# Patient Record
Sex: FEMALE
[De-identification: names, ages, dates, MRNs, and addresses within clinical notes are randomized per-mention and may not be internally consistent; named-entity substitution may affect disease eponyms.]

---

## 2020-09-10 PROBLEM — Z00.00 ENCOUNTER FOR PREVENTIVE HEALTH EXAMINATION: Status: ACTIVE | Noted: 2020-09-10

## 2020-09-17 ENCOUNTER — APPOINTMENT (OUTPATIENT)
Dept: BARIATRICS | Facility: CLINIC | Age: 50
End: 2020-09-17
Payer: COMMERCIAL

## 2020-09-17 ENCOUNTER — TRANSCRIPTION ENCOUNTER (OUTPATIENT)
Age: 50
End: 2020-09-17

## 2020-09-17 VITALS — HEIGHT: 62 IN | BODY MASS INDEX: 34.6 KG/M2 | WEIGHT: 188 LBS

## 2020-09-17 DIAGNOSIS — Z78.9 OTHER SPECIFIED HEALTH STATUS: ICD-10-CM

## 2020-09-17 DIAGNOSIS — Z97.5 PRESENCE OF (INTRAUTERINE) CONTRACEPTIVE DEVICE: ICD-10-CM

## 2020-09-17 DIAGNOSIS — M17.10 UNILATERAL PRIMARY OSTEOARTHRITIS, UNSPECIFIED KNEE: ICD-10-CM

## 2020-09-17 DIAGNOSIS — Z87.42 PERSONAL HISTORY OF OTHER DISEASES OF THE FEMALE GENITAL TRACT: ICD-10-CM

## 2020-09-17 DIAGNOSIS — Z80.8 FAMILY HISTORY OF MALIGNANT NEOPLASM OF OTHER ORGANS OR SYSTEMS: ICD-10-CM

## 2020-09-17 DIAGNOSIS — Z82.49 FAMILY HISTORY OF ISCHEMIC HEART DISEASE AND OTHER DISEASES OF THE CIRCULATORY SYSTEM: ICD-10-CM

## 2020-09-17 PROCEDURE — 99213 OFFICE O/P EST LOW 20 MIN: CPT | Mod: 95

## 2020-09-17 RX ORDER — EVENING PRIMROSE OIL 500 MG
CAPSULE ORAL
Refills: 0 | Status: ACTIVE | COMMUNITY

## 2020-09-17 RX ORDER — FERROUS SULFATE 325(65) MG
325 TABLET ORAL
Refills: 0 | Status: ACTIVE | COMMUNITY

## 2020-09-17 RX ORDER — MULTIVITAMIN
TABLET ORAL
Refills: 0 | Status: ACTIVE | COMMUNITY

## 2020-09-17 NOTE — ASSESSMENT
[FreeTextEntry1] : 49 year old female with class I obesity s/p RYGB\par Lifestyle modification goals- Will keep food log on myfitness pal, preportion snacks.  Will refer to RD.  Patient to drink more water/less cyrstal lite.  Keep track of steps on pedometer- current goal 7000/day.  Start weight lifting/cross training 3x a week- can look online for youtube classes or free trial [employer].  \par Discussed medication possibilities- will have PMD fax me her recent labs from her physical.  Reviewed possibility of starting on metformin again for PCOS using ER formulation- reviewed possible side effects including GI and LA.  Discussed Contrave- possible side effects including depression/anxiety/SI, headaches, seizures, interaction with opioids.  Will email patient Contrave information for her review with her consent.\par Patient consented to receive email with visit summary\par RTO 3 weeks\par

## 2020-09-17 NOTE — CONSULT LETTER
[Dear  ___] : Dear  [unfilled], [Consult Letter:] : I had the pleasure of evaluating your patient, [unfilled]. [( Thank you for referring [unfilled] for consultation for _____ )] : Thank you for referring [unfilled] for consultation for [unfilled] [Please see my note below.] : Please see my note below. [Consult Closing:] : Thank you very much for allowing me to participate in the care of this patient.  If you have any questions, please do not hesitate to contact me. [Sincerely,] : Sincerely, [FreeTextEntry3] : Marilee Kohli

## 2020-09-17 NOTE — REASON FOR VISIT
[Home] : at home, [unfilled] , at the time of the visit. [Other Location: e.g. Home (Enter Location, City,State)___] : at [unfilled] [Verbal consent obtained from patient] : the patient, [unfilled] [Initial Consultation] : an initial consultation for [Obesity] : obesity [Polycystic Ovary Syndrome] : polycystic ovary syndrome

## 2020-09-17 NOTE — HISTORY OF PRESENT ILLNESS
[FreeTextEntry1] : 49 year old female for medical weight loss consultation referred by Dr. Conway.  Patient has struggled with her weight since puberty.  Diagnosed with PCOS and had prediabees prior to RYGB at [facility] in 2013.  Her highest weight was 268 prior to surgery and she lost 130 pounds.  About a year ago she tried Optavia and phentermine without success.  She only lost 8 pounds in 2 months.  In total she gained 50 pounds in the past year- 30 pounds prior to the pandemic and 20 pound since.  About a year ago she moved from [place] to [facility] and does not like the area she currently lives in.  This has been stressful for her.  She has not found a good exercise gym/program by her new house and stopped exercising completely.  She does stress/emotional eat.  Works as an  and is sedentary throughout the day.  Has a treadmill and weights in her house but does not use them.  Also has a fitbit ranges 5042-9058 steps/day.  Sleeps 6-7 hours/night.  Drinks only Crystal Lite transitioning to work at home d/t the pandemic.  Lives with her boyfriend.  Does the grocery shopping and cooking.  Planning for TKR in 1/2020.  \par Food recall- Breakfast- yogurt, berries, protein powder L- salsa fresca chicken, beans rice, cheese S- nut mix D- rotisserie chicken S- yogurt \par Had taken metformin for PCOS prior to RYBG but had bad indigestion on it.  Had allergic site reaction to Saxenda which she paid out of pocket for.  Does believe she has obesity benefits now that she has met her deducible.

## 2020-09-22 RX ORDER — NALTREXONE HYDROCHLORIDE AND BUPROPION HYDROCHLORIDE 8; 90 MG/1; MG/1
8-90 TABLET, EXTENDED RELEASE ORAL
Qty: 120 | Refills: 2 | Status: ACTIVE | COMMUNITY
Start: 2020-09-22 | End: 1900-01-01

## 2020-10-22 ENCOUNTER — APPOINTMENT (OUTPATIENT)
Dept: BARIATRICS | Facility: CLINIC | Age: 50
End: 2020-10-22
Payer: COMMERCIAL

## 2020-10-22 VITALS — BODY MASS INDEX: 34.02 KG/M2 | WEIGHT: 186 LBS

## 2020-10-22 DIAGNOSIS — E66.9 OBESITY, UNSPECIFIED: ICD-10-CM

## 2020-10-22 PROCEDURE — 99214 OFFICE O/P EST MOD 30 MIN: CPT | Mod: 95

## 2020-10-22 NOTE — HISTORY OF PRESENT ILLNESS
[FreeTextEntry1] : 49 year old female for medical weight loss consultation referred by Dr. Conway.  Patient has struggled with her weight since puberty.  Diagnosed with PCOS and had prediabees prior to RYGB at [facility] in 2013.  Her highest weight was 268 prior to surgery and she lost 130 pounds.  About a year ago she tried Optavia and phentermine without success.  She only lost 8 pounds in 2 months.  In total she gained 50 pounds in the past year- 30 pounds prior to the pandemic and 20 pound since.  About a year ago she moved from [place] to [facility] and does not like the area she currently lives in.  This has been stressful for her.  She has not found a good exercise gym/program by her new house and stopped exercising completely.  She does stress/emotional eat.  Works as an  and is sedentary throughout the day.  Has a treadmill and weights in her house but does not use them.  Also has a fitbit ranges 9456-5187 steps/day.  Sleeps 6-7 hours/night.  Drinks only Crystal Lite transitioning to work at home d/t the pandemic.  Lives with her boyfriend.  Does the grocery shopping and cooking.  Planning for TKR in 1/2020.  \par Food recall- Breakfast- yogurt, berries, protein powder L- salsa fresca chicken, beans rice, cheese S- nut mix D- rotisserie chicken S- yogurt \par Had taken metformin for PCOS prior to RYBG but had bad indigestion on it.  Had allergic site reaction to Saxenda which she paid out of pocket for.  Does believe she has obesity benefits now that she has met her deducible.  \par \par 10/22/20- Feels well.  Started 4 tabs/day of Contrave this week without any side effects.  Lost 2 pounds but always feels hungry.  Food recall- B greek yogurt S- skinnypop L- grilled chicken on salad S- skinny pop D- rotisserie chicken.  Does like vegetables (salad, cucumbers, carrots, celery, cauliflower, and broccoli, but doesn't routine eat them.  Likes fruit but also does not routinely eat fruit).  Had a busy time at work and hasn't started exercising yet.  Thinks she may have found a gym or  in her area- needs to contact them.

## 2020-10-22 NOTE — ASSESSMENT
[FreeTextEntry1] : 50 year old female with h/o GB surgery, PCOS and obesity\par Continue Contrave 4 tablets/day- discussed that prior to knee replacement surgery in January we will need to d/c naltrexone and switch to just bupropion\par F/U with RD, will increase fruit and vegetables in her diet, healthy proteins and fats\par Will start exercise regimen- 7000 steps/day and or cardiovascular exercise 3x a week\par RTO 1 month

## 2020-10-22 NOTE — REASON FOR VISIT
[Home] : at home, [unfilled] , at the time of the visit. [Other Location: e.g. Home (Enter Location, City,State)___] : at [unfilled] [Verbal consent obtained from patient] : the patient, [unfilled] [Follow-Up Visit] : a follow-up visit for [Obesity] : obesity

## 2020-10-29 ENCOUNTER — TRANSCRIPTION ENCOUNTER (OUTPATIENT)
Age: 50
End: 2020-10-29

## 2020-11-04 ENCOUNTER — TRANSCRIPTION ENCOUNTER (OUTPATIENT)
Age: 50
End: 2020-11-04

## 2020-11-19 ENCOUNTER — APPOINTMENT (OUTPATIENT)
Dept: BARIATRICS | Facility: CLINIC | Age: 50
End: 2020-11-19

## 2023-02-27 ENCOUNTER — APPOINTMENT (OUTPATIENT)
Dept: BARIATRICS/WEIGHT MGMT | Facility: CLINIC | Age: 53
End: 2023-02-27